# Patient Record
Sex: MALE | URBAN - METROPOLITAN AREA
[De-identification: names, ages, dates, MRNs, and addresses within clinical notes are randomized per-mention and may not be internally consistent; named-entity substitution may affect disease eponyms.]

---

## 2022-06-04 ENCOUNTER — TELEPHONE ENCOUNTER (OUTPATIENT)
Dept: URBAN - METROPOLITAN AREA CLINIC 68 | Facility: CLINIC | Age: 72
End: 2022-06-04

## 2022-06-05 ENCOUNTER — TELEPHONE ENCOUNTER (OUTPATIENT)
Dept: URBAN - METROPOLITAN AREA CLINIC 68 | Facility: CLINIC | Age: 72
End: 2022-06-05

## 2022-06-25 ENCOUNTER — TELEPHONE ENCOUNTER (OUTPATIENT)
Age: 72
End: 2022-06-25

## 2022-06-26 ENCOUNTER — TELEPHONE ENCOUNTER (OUTPATIENT)
Age: 72
End: 2022-06-26

## 2023-12-01 DIAGNOSIS — I10 ESSENTIAL (PRIMARY) HYPERTENSION: ICD-10-CM

## 2023-12-01 PROBLEM — D68.51 FACTOR V LEIDEN MUTATION (MULTI): Status: ACTIVE | Noted: 2023-12-01

## 2023-12-01 PROBLEM — E78.5 HYPERLIPIDEMIA: Status: ACTIVE | Noted: 2023-12-01

## 2023-12-01 PROBLEM — K21.9 ACID REFLUX: Status: ACTIVE | Noted: 2023-12-01

## 2023-12-01 PROBLEM — G62.9 NEUROPATHY: Status: ACTIVE | Noted: 2023-12-01

## 2023-12-01 PROBLEM — I48.0 PAROXYSMAL ATRIAL FIBRILLATION (MULTI): Status: ACTIVE | Noted: 2023-12-01

## 2023-12-01 PROBLEM — I49.3 PREMATURE VENTRICULAR CONTRACTIONS: Status: ACTIVE | Noted: 2023-12-01

## 2023-12-01 RX ORDER — WARFARIN 4 MG/1
TABLET ORAL
COMMUNITY

## 2023-12-01 RX ORDER — GLUCOSAMINE/CHONDR SU A SOD 167-133 MG
2500 CAPSULE ORAL DAILY
COMMUNITY

## 2023-12-01 RX ORDER — EZETIMIBE 10 MG/1
10 TABLET ORAL DAILY
COMMUNITY

## 2023-12-01 RX ORDER — METOPROLOL SUCCINATE 25 MG/1
12.5 TABLET, EXTENDED RELEASE ORAL DAILY
COMMUNITY
End: 2024-03-18 | Stop reason: SDUPTHER

## 2023-12-01 RX ORDER — AMLODIPINE BESYLATE 10 MG/1
10 TABLET ORAL DAILY
COMMUNITY
End: 2024-03-18

## 2023-12-06 RX ORDER — METOPROLOL SUCCINATE 25 MG/1
12.5 TABLET, EXTENDED RELEASE ORAL DAILY
Qty: 45 TABLET | Refills: 3 | Status: SHIPPED | OUTPATIENT
Start: 2023-12-06 | End: 2024-02-26 | Stop reason: WASHOUT

## 2024-01-24 ENCOUNTER — APPOINTMENT (OUTPATIENT)
Dept: CARDIOLOGY | Facility: CLINIC | Age: 74
End: 2024-01-24
Payer: MEDICARE

## 2024-02-26 ENCOUNTER — OFFICE VISIT (OUTPATIENT)
Dept: CARDIOLOGY | Facility: CLINIC | Age: 74
End: 2024-02-26
Payer: MEDICARE

## 2024-02-26 VITALS
SYSTOLIC BLOOD PRESSURE: 116 MMHG | HEART RATE: 78 BPM | DIASTOLIC BLOOD PRESSURE: 86 MMHG | HEIGHT: 74 IN | BODY MASS INDEX: 33.24 KG/M2 | WEIGHT: 259 LBS

## 2024-02-26 DIAGNOSIS — I48.0 PAROXYSMAL ATRIAL FIBRILLATION (MULTI): ICD-10-CM

## 2024-02-26 DIAGNOSIS — I10 ESSENTIAL HYPERTENSION: ICD-10-CM

## 2024-02-26 DIAGNOSIS — D68.51 FACTOR V LEIDEN MUTATION (MULTI): ICD-10-CM

## 2024-02-26 DIAGNOSIS — E78.2 MIXED HYPERLIPIDEMIA: ICD-10-CM

## 2024-02-26 DIAGNOSIS — Z78.9 NEVER SMOKED TOBACCO: ICD-10-CM

## 2024-02-26 PROCEDURE — 1159F MED LIST DOCD IN RCRD: CPT | Performed by: INTERNAL MEDICINE

## 2024-02-26 PROCEDURE — 3074F SYST BP LT 130 MM HG: CPT | Performed by: INTERNAL MEDICINE

## 2024-02-26 PROCEDURE — 99214 OFFICE O/P EST MOD 30 MIN: CPT | Performed by: INTERNAL MEDICINE

## 2024-02-26 PROCEDURE — 1036F TOBACCO NON-USER: CPT | Performed by: INTERNAL MEDICINE

## 2024-02-26 PROCEDURE — 3079F DIAST BP 80-89 MM HG: CPT | Performed by: INTERNAL MEDICINE

## 2024-02-26 NOTE — PROGRESS NOTES
"Subjective   Russel Melgar is a 73 y.o. male       Chief Complaint    Follow-up          HPI     Patient returns in follow-up of problems as noted.  He is done well.  Blood pressure control is satisfactory and recent lipid profile demonstrates very favorable control of his lipids through the use of ezetimibe and niacin.  In light of all the above we believe he is doing well.    The reason and/or rationale for blood pressure and lipid control was discussed in great detail and he was educated regarding symptoms to watch for.  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    Stroke risk is mitigated by chronic warfarin therapy not only for the arrhythmia but also for his factor V Leiden mutation.  Because of all the above Elda is doing well.  As before the merits of diet and weight loss were advocated.        Vitals:    02/26/24 0948   BP: 116/86   BP Location: Right arm   Patient Position: Sitting   Pulse: 78   Weight: 117 kg (259 lb)   Height: 1.88 m (6' 2\")        Objective   Physical Exam  Constitutional:       Appearance: Normal appearance. He is obese.   HENT:      Nose: Nose normal.   Neck:      Vascular: No carotid bruit.   Cardiovascular:      Rate and Rhythm: Normal rate.      Pulses: Normal pulses.      Heart sounds: Normal heart sounds.   Pulmonary:      Effort: Pulmonary effort is normal.   Abdominal:      General: Bowel sounds are normal.      Palpations: Abdomen is soft.   Musculoskeletal:         General: Normal range of motion.      Cervical back: Normal range of motion.      Right lower leg: No edema.      Left lower leg: No edema.   Skin:     General: Skin is warm and dry.   Neurological:      General: No focal deficit present.      Mental Status: He is alert.   Psychiatric:         Mood and Affect: Mood normal.         Behavior: Behavior normal.         Thought Content: Thought content normal.         Judgment: " Judgment normal.         Allergies  Bempedoic acid, Ezetimibe-simvastatin, Statins-hmg-coa reductase inhibitors, and Penicillins     Current Medications    Current Outpatient Medications:     amLODIPine (Norvasc) 10 mg tablet, Take 1 tablet (10 mg) by mouth once daily., Disp: , Rfl:     ezetimibe (Zetia) 10 mg tablet, Take 1 tablet (10 mg) by mouth once daily., Disp: , Rfl:     metoprolol succinate XL (Toprol-XL) 25 mg 24 hr tablet, Take 0.5 tablets (12.5 mg) by mouth once daily., Disp: , Rfl:     niacin 500 mg tablet, Take 5 tablets (2,500 mg) by mouth once daily., Disp: , Rfl:     warfarin (Coumadin) 4 mg tablet, Take by mouth. Take as directed by Dr. Love, Disp: , Rfl:                      Assessment/Plan   1. Paroxysmal atrial fibrillation (CMS/HCC)  No symptomatic recurrence based upon review.  Stroke risk mitigated by warfarin therapy    2. Essential hypertension  Good control on present therapy.  No need for adjustment    3. Mixed hyperlipidemia  Good control on present therapy.  No need for adjustment    4. Never smoked tobacco  Noted    5. Factor V Leiden mutation (CMS/HCC)  His coagulopathy necessitates anticoagulation.  This also suits as coverage for his history of paroxysmal atrial fibrillation.          Scribe Attestation  By signing my name below, I, Sadaf Odell LPN   attest that this documentation has been prepared under the direction and in the presence of Oh Garcia MD.     Provider Attestation - Scribe documentation    All medical record entries made by the Scribe were at my direction and personally dictated by me. I have reviewed the chart and agree that the record accurately reflects my personal performance of the history, physical exam, discussion and plan.

## 2024-02-26 NOTE — LETTER
"February 26, 2024     Abundio Love DO  [address] [place] [address]    Patient: Russel Melgar   YOB: 1950   Date of Visit: 2/26/2024       Dear Dr. Abundio Love DO:    Thank you for referring Russel Melgar to me for evaluation. Below are my notes for this consultation.  If you have questions, please do not hesitate to call me. I look forward to following your patient along with you.       Sincerely,     Oh Garcia MD      CC: No Recipients  ______________________________________________________________________________________    Subjective   Russel Melgar is a 73 y.o. male       Chief Complaint    Follow-up          HPI     Patient returns in follow-up of problems as noted.  He is done well.  Blood pressure control is satisfactory and recent lipid profile demonstrates very favorable control of his lipids through the use of ezetimibe and niacin.  In light of all the above we believe he is doing well.    The reason and/or rationale for blood pressure and lipid control was discussed in great detail and he was educated regarding symptoms to watch for.  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    Stroke risk is mitigated by chronic warfarin therapy not only for the arrhythmia but also for his factor V Leiden mutation.  Because of all the above Elda is doing well.  As before the merits of diet and weight loss were advocated.        Vitals:    02/26/24 0948   BP: 116/86   BP Location: Right arm   Patient Position: Sitting   Pulse: 78   Weight: 117 kg (259 lb)   Height: 1.88 m (6' 2\")        Objective   Physical Exam  Constitutional:       Appearance: Normal appearance. He is obese.   HENT:      Nose: Nose normal.   Neck:      Vascular: No carotid bruit.   Cardiovascular:      Rate and Rhythm: Normal rate.      Pulses: Normal pulses.      Heart sounds: Normal heart sounds.   Pulmonary: "      Effort: Pulmonary effort is normal.   Abdominal:      General: Bowel sounds are normal.      Palpations: Abdomen is soft.   Musculoskeletal:         General: Normal range of motion.      Cervical back: Normal range of motion.      Right lower leg: No edema.      Left lower leg: No edema.   Skin:     General: Skin is warm and dry.   Neurological:      General: No focal deficit present.      Mental Status: He is alert.   Psychiatric:         Mood and Affect: Mood normal.         Behavior: Behavior normal.         Thought Content: Thought content normal.         Judgment: Judgment normal.         Allergies  Bempedoic acid, Ezetimibe-simvastatin, Statins-hmg-coa reductase inhibitors, and Penicillins     Current Medications    Current Outpatient Medications:   •  amLODIPine (Norvasc) 10 mg tablet, Take 1 tablet (10 mg) by mouth once daily., Disp: , Rfl:   •  ezetimibe (Zetia) 10 mg tablet, Take 1 tablet (10 mg) by mouth once daily., Disp: , Rfl:   •  metoprolol succinate XL (Toprol-XL) 25 mg 24 hr tablet, Take 0.5 tablets (12.5 mg) by mouth once daily., Disp: , Rfl:   •  niacin 500 mg tablet, Take 5 tablets (2,500 mg) by mouth once daily., Disp: , Rfl:   •  warfarin (Coumadin) 4 mg tablet, Take by mouth. Take as directed by Dr. Love, Disp: , Rfl:                      Assessment/Plan   1. Paroxysmal atrial fibrillation (CMS/HCC)  No symptomatic recurrence based upon review.  Stroke risk mitigated by warfarin therapy    2. Essential hypertension  Good control on present therapy.  No need for adjustment    3. Mixed hyperlipidemia  Good control on present therapy.  No need for adjustment    4. Never smoked tobacco  Noted    5. Factor V Leiden mutation (CMS/HCC)  His coagulopathy necessitates anticoagulation.  This also suits as coverage for his history of paroxysmal atrial fibrillation.          Scribe Attestation  By signing my name below, Heena YOUNG LPN  , Scribjames   attest that this documentation has been  prepared under the direction and in the presence of Oh Garcia MD.     Provider Attestation - Scribe documentation    All medical record entries made by the Scribe were at my direction and personally dictated by me. I have reviewed the chart and agree that the record accurately reflects my personal performance of the history, physical exam, discussion and plan.

## 2024-02-26 NOTE — PATIENT INSTRUCTIONS
Please bring all medicines, vitamins, and herbal supplements with you when you come to the office.    Prescriptions will not be filled unless you are compliant with your follow up appointments or have a follow up appointment scheduled as per instruction of your physician. Refills should be requested at the time of your visit.     BMI was above normal measurement. Current weight: 117 kg (259 lb)  Weight change since last visit (-) denotes wt loss -2 lbs   Weight loss needed to achieve BMI 25: 64.7 Lbs  Weight loss needed to achieve BMI 30: 25.8 Lbs  Provided instructions on dietary changes  Provided instructions on exercise.

## 2024-03-15 DIAGNOSIS — I10 ESSENTIAL (PRIMARY) HYPERTENSION: ICD-10-CM

## 2024-03-18 DIAGNOSIS — I10 ESSENTIAL HYPERTENSION: ICD-10-CM

## 2024-03-18 RX ORDER — METOPROLOL SUCCINATE 25 MG/1
12.5 TABLET, EXTENDED RELEASE ORAL DAILY
Qty: 45 TABLET | Refills: 3 | Status: SHIPPED | OUTPATIENT
Start: 2024-03-18 | End: 2025-03-18

## 2024-03-18 RX ORDER — AMLODIPINE BESYLATE 10 MG/1
10 TABLET ORAL DAILY
Qty: 90 TABLET | Refills: 3 | Status: SHIPPED | OUTPATIENT
Start: 2024-03-18

## 2024-03-27 ENCOUNTER — TELEPHONE (OUTPATIENT)
Dept: CARDIOLOGY | Facility: CLINIC | Age: 74
End: 2024-03-27
Payer: MEDICARE

## 2024-03-27 NOTE — TELEPHONE ENCOUNTER
Phoned patient's wife back. She had questions about BNP results done at a out reach clinic. BNP was 571. Lab scanned into chart. Please review. Patient's wife is concerned.     To Dr. Oh Garcia MD

## 2024-06-13 DIAGNOSIS — E78.5 HYPERLIPIDEMIA, UNSPECIFIED: ICD-10-CM

## 2024-06-13 RX ORDER — EZETIMIBE 10 MG/1
10 TABLET ORAL DAILY
Qty: 90 TABLET | Refills: 3 | Status: SHIPPED | OUTPATIENT
Start: 2024-06-13 | End: 2025-06-13

## 2024-09-17 ENCOUNTER — APPOINTMENT (OUTPATIENT)
Dept: CARDIOLOGY | Facility: CLINIC | Age: 74
End: 2024-09-17
Payer: MEDICARE

## 2024-09-17 VITALS
DIASTOLIC BLOOD PRESSURE: 70 MMHG | SYSTOLIC BLOOD PRESSURE: 110 MMHG | HEIGHT: 74 IN | WEIGHT: 250 LBS | HEART RATE: 72 BPM | BODY MASS INDEX: 32.08 KG/M2

## 2024-09-17 DIAGNOSIS — I48.21 PERMANENT ATRIAL FIBRILLATION (MULTI): ICD-10-CM

## 2024-09-17 DIAGNOSIS — Z78.9 NEVER SMOKED TOBACCO: ICD-10-CM

## 2024-09-17 DIAGNOSIS — D68.51 FACTOR V LEIDEN MUTATION (MULTI): ICD-10-CM

## 2024-09-17 DIAGNOSIS — I10 ESSENTIAL HYPERTENSION: ICD-10-CM

## 2024-09-17 DIAGNOSIS — Z79.01 LONG TERM CURRENT USE OF ANTICOAGULANT THERAPY: ICD-10-CM

## 2024-09-17 DIAGNOSIS — E78.5 HYPERLIPIDEMIA, UNSPECIFIED: ICD-10-CM

## 2024-09-17 DIAGNOSIS — E78.2 MIXED HYPERLIPIDEMIA: ICD-10-CM

## 2024-09-17 DIAGNOSIS — Z78.9 STATIN INTOLERANCE: ICD-10-CM

## 2024-09-17 PROCEDURE — 3078F DIAST BP <80 MM HG: CPT | Performed by: INTERNAL MEDICINE

## 2024-09-17 PROCEDURE — 1036F TOBACCO NON-USER: CPT | Performed by: INTERNAL MEDICINE

## 2024-09-17 PROCEDURE — 3074F SYST BP LT 130 MM HG: CPT | Performed by: INTERNAL MEDICINE

## 2024-09-17 PROCEDURE — 1159F MED LIST DOCD IN RCRD: CPT | Performed by: INTERNAL MEDICINE

## 2024-09-17 PROCEDURE — 99214 OFFICE O/P EST MOD 30 MIN: CPT | Performed by: INTERNAL MEDICINE

## 2024-09-17 RX ORDER — EZETIMIBE 10 MG/1
10 TABLET ORAL EVERY OTHER DAY
Qty: 45 TABLET | Refills: 3 | Status: SHIPPED | OUTPATIENT
Start: 2024-09-17 | End: 2025-09-17

## 2024-09-17 RX ORDER — EZETIMIBE 10 MG/1
10 TABLET ORAL EVERY OTHER DAY
Qty: 45 TABLET | Refills: 3 | Status: CANCELLED | OUTPATIENT
Start: 2024-09-17 | End: 2025-09-17

## 2024-09-17 NOTE — PROGRESS NOTES
"Subjective   Russel Melgar is a 74 y.o. male       Chief Complaint    Follow-up          HPI   Patient is a 74-year-old  who has previously followed with Dr. Garcia for permanent atrial fibrillation.  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  He maintains active lifestyle.  He has been on Coumadin therapy managed by [facility].  He has hypertension controlled on amlodipine and metoprolol and has factor V Leiden with no previous VTE.  Recent lab data were provided, it was reviewed and shared with the patient with no concern noted.  Review of system was normal physical examination was only remarkable for irregular rhythm and class I obesity.    Assessment/recommendations:    1-permanent atrial fibrillation managed with metoprolol and Coumadin, asymptomatic with no underlying organic heart disease.  Echocardiogram 2019 was unremarkable.  No previous strokes or TIA.  Past medical therapy will be left unchanged  2-essential hypertension, currently under control on amlodipine and metoprolol, well-tolerated  3-factor V Leiden with no previous VTE  4-high risk medication with warfarin with no bleeding complications  5-class I obesity, patient maintains active lifestyle and was encouraged to keep doing so.  6-dyslipidemia on ezetimibe and niacin, he is intolerant to statin, his lipids are under control  Review of Systems   All other systems reviewed and are negative.           Vitals:    09/17/24 0855   BP: 110/70   BP Location: Right arm   Patient Position: Sitting   Pulse: 72   Weight: 113 kg (250 lb)   Height: 1.88 m (6' 2\")        Objective   Physical Exam  Constitutional:       Appearance: Normal appearance.   HENT:      Nose: Nose normal.   Neck:      Vascular: No carotid bruit.   Cardiovascular:      Rate and Rhythm: Normal rate. Rhythm irregular.      Pulses: Normal pulses.      Heart sounds: Normal heart sounds.   Pulmonary:      Effort: Pulmonary effort is " normal.   Abdominal:      General: Bowel sounds are normal.      Palpations: Abdomen is soft.   Musculoskeletal:         General: Normal range of motion.      Cervical back: Normal range of motion.      Right lower leg: No edema.      Left lower leg: No edema.   Skin:     General: Skin is warm and dry.   Neurological:      General: No focal deficit present.      Mental Status: He is alert.   Psychiatric:         Mood and Affect: Mood normal.         Behavior: Behavior normal.         Thought Content: Thought content normal.         Judgment: Judgment normal.         Allergies  Bempedoic acid, Ezetimibe-simvastatin, Penicillins, and Statins-hmg-coa reductase inhibitors     Current Medications    Current Outpatient Medications:     amLODIPine (Norvasc) 10 mg tablet, TAKE 1 TABLET BY MOUTH EVERY DAY, Disp: 90 tablet, Rfl: 3    ezetimibe (Zetia) 10 mg tablet, Take 1 tablet (10 mg) by mouth once daily., Disp: 90 tablet, Rfl: 3    metoprolol succinate XL (Toprol-XL) 25 mg 24 hr tablet, Take 0.5 tablets (12.5 mg) by mouth once daily., Disp: 45 tablet, Rfl: 3    niacin 500 mg tablet, Take 5 tablets (2,500 mg) by mouth once daily., Disp: , Rfl:     warfarin (Coumadin) 4 mg tablet, Take by mouth. Take as directed by Dr. Love, Disp: , Rfl:                      Assessment/Plan   1. Paroxysmal atrial fibrillation (Multi)  Follow Up In Cardiology      2. Essential hypertension  Follow Up In Cardiology      3. Mixed hyperlipidemia        4. Factor V Leiden mutation (Multi)        5. Long term current use of anticoagulant therapy        6. Never smoked tobacco        7. BMI 32.0-32.9,adult        8. Statin intolerance        9. Hyperlipidemia, unspecified                 Scribe Attestation  By signing my name below, Nan YOUNG LPN  , Scribjames   attest that this documentation has been prepared under the direction and in the presence of Kamilah Stockton MD.     Provider Attestation - Scribe documentation    All medical record  entries made by the Scribe were at my direction and personally dictated by me. I have reviewed the chart and agree that the record accurately reflects my personal performance of the history, physical exam, discussion and plan.

## 2024-09-17 NOTE — LETTER
September 17, 2024     Abundio Love DO  [address]  [place] [address]    Patient: Russel Melgar   YOB: 1950   Date of Visit: 9/17/2024       Dear Dr. Abundio Love DO:    Thank you for referring Russel Melgar to me for evaluation. Below are my notes for this consultation.  If you have questions, please do not hesitate to call me. I look forward to following your patient along with you.       Sincerely,     Kamilah Stockton MD      CC: No Recipients  ______________________________________________________________________________________    Subjective   Russel Melgar is a 74 y.o. male       Chief Complaint    Follow-up          HPI   Patient is a 74-year-old  who has previously followed with Dr. Garcia for permanent atrial fibrillation.  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  He maintains active lifestyle.  He has been on Coumadin therapy managed by [facility].  He has hypertension controlled on amlodipine and metoprolol and has factor V Leiden with no previous VTE.  Recent lab data were provided, it was reviewed and shared with the patient with no concern noted.  Review of system was normal physical examination was only remarkable for irregular rhythm and class I obesity.    Assessment/recommendations:    1-permanent atrial fibrillation managed with metoprolol and Coumadin, asymptomatic with no underlying organic heart disease.  Echocardiogram 2019 was unremarkable.  No previous strokes or TIA.  Past medical therapy will be left unchanged  2-essential hypertension, currently under control on amlodipine and metoprolol, well-tolerated  3-factor V Leiden with no previous VTE  4-high risk medication with warfarin with no bleeding complications  5-class I obesity, patient maintains active lifestyle and was encouraged to keep doing so.  6-dyslipidemia on ezetimibe and niacin, he is intolerant to statin, his lipids are under  "control  Review of Systems   All other systems reviewed and are negative.           Vitals:    09/17/24 0855   BP: 110/70   BP Location: Right arm   Patient Position: Sitting   Pulse: 72   Weight: 113 kg (250 lb)   Height: 1.88 m (6' 2\")        Objective   Physical Exam  Constitutional:       Appearance: Normal appearance.   HENT:      Nose: Nose normal.   Neck:      Vascular: No carotid bruit.   Cardiovascular:      Rate and Rhythm: Normal rate. Rhythm irregular.      Pulses: Normal pulses.      Heart sounds: Normal heart sounds.   Pulmonary:      Effort: Pulmonary effort is normal.   Abdominal:      General: Bowel sounds are normal.      Palpations: Abdomen is soft.   Musculoskeletal:         General: Normal range of motion.      Cervical back: Normal range of motion.      Right lower leg: No edema.      Left lower leg: No edema.   Skin:     General: Skin is warm and dry.   Neurological:      General: No focal deficit present.      Mental Status: He is alert.   Psychiatric:         Mood and Affect: Mood normal.         Behavior: Behavior normal.         Thought Content: Thought content normal.         Judgment: Judgment normal.         Allergies  Bempedoic acid, Ezetimibe-simvastatin, Penicillins, and Statins-hmg-coa reductase inhibitors     Current Medications    Current Outpatient Medications:   •  amLODIPine (Norvasc) 10 mg tablet, TAKE 1 TABLET BY MOUTH EVERY DAY, Disp: 90 tablet, Rfl: 3  •  ezetimibe (Zetia) 10 mg tablet, Take 1 tablet (10 mg) by mouth once daily., Disp: 90 tablet, Rfl: 3  •  metoprolol succinate XL (Toprol-XL) 25 mg 24 hr tablet, Take 0.5 tablets (12.5 mg) by mouth once daily., Disp: 45 tablet, Rfl: 3  •  niacin 500 mg tablet, Take 5 tablets (2,500 mg) by mouth once daily., Disp: , Rfl:   •  warfarin (Coumadin) 4 mg tablet, Take by mouth. Take as directed by Dr. Love, Disp: , Rfl:                      Assessment/Plan   1. Paroxysmal atrial fibrillation (Multi)  Follow Up In Cardiology    "   2. Essential hypertension  Follow Up In Cardiology      3. Mixed hyperlipidemia        4. Factor V Leiden mutation (Multi)        5. Long term current use of anticoagulant therapy        6. Never smoked tobacco        7. BMI 32.0-32.9,adult        8. Statin intolerance        9. Hyperlipidemia, unspecified                 Scribe Attestation  By signing my name below, INan LPN, Scribe   attest that this documentation has been prepared under the direction and in the presence of Kamilah Stockton MD.     Provider Attestation - Scribe documentation    All medical record entries made by the Scribe were at my direction and personally dictated by me. I have reviewed the chart and agree that the record accurately reflects my personal performance of the history, physical exam, discussion and plan.

## 2024-09-17 NOTE — PATIENT INSTRUCTIONS
Please bring all medicines, vitamins, and herbal supplements with you when you come to the office.    Prescriptions will not be filled unless you are compliant with your follow up appointments or have a follow up appointment scheduled as per instruction of your physician. Refills should be requested at the time of your visit.     BMI was above normal measurement. Current weight: 113 kg (250 lb)  Weight change since last visit (-) denotes wt loss -9 lbs   Weight loss needed to achieve BMI 25: 55.7 Lbs  Weight loss needed to achieve BMI 30: 16.8 Lbs  Provided instructions on dietary changes  Provided instructions on exercise.

## 2025-03-08 DIAGNOSIS — I10 ESSENTIAL (PRIMARY) HYPERTENSION: ICD-10-CM

## 2025-03-08 DIAGNOSIS — I10 ESSENTIAL HYPERTENSION: ICD-10-CM

## 2025-03-10 RX ORDER — METOPROLOL SUCCINATE 25 MG/1
12.5 TABLET, EXTENDED RELEASE ORAL DAILY
Qty: 45 TABLET | Refills: 3 | Status: SHIPPED | OUTPATIENT
Start: 2025-03-10

## 2025-03-10 RX ORDER — AMLODIPINE BESYLATE 10 MG/1
10 TABLET ORAL DAILY
Qty: 90 TABLET | Refills: 3 | Status: SHIPPED | OUTPATIENT
Start: 2025-03-10

## 2025-06-18 ENCOUNTER — APPOINTMENT (OUTPATIENT)
Dept: CARDIOLOGY | Facility: CLINIC | Age: 75
End: 2025-06-18
Payer: MEDICARE

## 2025-06-18 VITALS
BODY MASS INDEX: 31.7 KG/M2 | DIASTOLIC BLOOD PRESSURE: 80 MMHG | SYSTOLIC BLOOD PRESSURE: 120 MMHG | HEART RATE: 64 BPM | WEIGHT: 247 LBS | HEIGHT: 74 IN

## 2025-06-18 DIAGNOSIS — E66.811 OBESITY, CLASS I, BMI 30-34.9: ICD-10-CM

## 2025-06-18 DIAGNOSIS — I48.21 PERMANENT ATRIAL FIBRILLATION (MULTI): Primary | ICD-10-CM

## 2025-06-18 DIAGNOSIS — Z79.01 LONG TERM CURRENT USE OF ANTICOAGULANT THERAPY: ICD-10-CM

## 2025-06-18 DIAGNOSIS — D68.51 FACTOR V LEIDEN MUTATION (MULTI): ICD-10-CM

## 2025-06-18 DIAGNOSIS — Z78.9 STATIN INTOLERANCE: ICD-10-CM

## 2025-06-18 DIAGNOSIS — E78.2 MIXED HYPERLIPIDEMIA: ICD-10-CM

## 2025-06-18 DIAGNOSIS — I10 ESSENTIAL HYPERTENSION: ICD-10-CM

## 2025-06-18 DIAGNOSIS — Z78.9 NEVER SMOKED TOBACCO: ICD-10-CM

## 2025-06-18 PROCEDURE — 3079F DIAST BP 80-89 MM HG: CPT | Performed by: INTERNAL MEDICINE

## 2025-06-18 PROCEDURE — 99214 OFFICE O/P EST MOD 30 MIN: CPT | Performed by: INTERNAL MEDICINE

## 2025-06-18 PROCEDURE — 3074F SYST BP LT 130 MM HG: CPT | Performed by: INTERNAL MEDICINE

## 2025-06-18 PROCEDURE — 1036F TOBACCO NON-USER: CPT | Performed by: INTERNAL MEDICINE

## 2025-06-18 PROCEDURE — 1159F MED LIST DOCD IN RCRD: CPT | Performed by: INTERNAL MEDICINE

## 2025-06-18 PROCEDURE — G2211 COMPLEX E/M VISIT ADD ON: HCPCS | Performed by: INTERNAL MEDICINE

## 2025-06-18 NOTE — LETTER
June 18, 2025     Abundio Love DO  [address]  [place] [address]    Patient: Russel Melgar   YOB: 1950   Date of Visit: 6/18/2025       Dear Dr. Abundio Love DO:    Thank you for referring Russel Melgar to me for evaluation. Below are my notes for this consultation.  If you have questions, please do not hesitate to call me. I look forward to following your patient along with you.       Sincerely,     Kamilah Stockton MD      CC: No Recipients  ______________________________________________________________________________________    HPI:  Patient is in the office for follow-up since he was last seen back in September 2020 for for permanent atrial fibrillation along with hypertension and dyslipidemia.  Since he was last seen in the office he has done very well and has had no complaints of palpitations or chest pain no dyspnea on exertion he maintains very active lifestyle.  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  His blood pressure is under control.  He has blood work that was done recently including CBC basic metabolic profile and other labs which I reviewed done through his PCP and his lab results were excellent.  His LDL cholesterol in the 120 mg/dL range and for primary prevention purposes this is acceptable.  Apart from class I obesity physical examination was unremarkable.  His weight is only 3 pounds below his weight last time.    Assessment/recommendations:     1-permanent atrial fibrillation managed with metoprolol and Coumadin, asymptomatic with no underlying organic heart disease.  Echocardiogram 2019 was unremarkable.  No previous strokes or TIA.  Present medical therapy will left unchanged.  2-essential hypertension, currently under control on amlodipine and metoprolol, well-tolerated, patient follows low-salt diet, is trying to lose weight and maintains active lifestyle.  He does not have sleep  "apnea  3-factor V Leiden with no previous VTE, currently anticoagulated with Coumadin  4-high risk medication with warfarin with no bleeding complications, CBC has been followed and has been normal.  The risk of bleeding was discussed  5-class I obesity, patient maintains active lifestyle and was encouraged to keep doing so.  6-dyslipidemia on ezetimibe and niacin, he is intolerant to statin, his lipids are under control for primary prevention purposes, LDL cholesterol below 130 mg/dL based on testing June 2025.    Review of Systems   All other systems reviewed and are negative.           Vitals:    06/18/25 0844   BP: 120/80   BP Location: Right arm   Patient Position: Sitting   Pulse: 64   Weight: 112 kg (247 lb)   Height: 1.88 m (6' 2\")        Objective   Physical Exam  Constitutional:       Appearance: Normal appearance.   HENT:      Nose: Nose normal.   Neck:      Vascular: No carotid bruit.   Cardiovascular:      Rate and Rhythm: Normal rate.      Pulses: Normal pulses.      Heart sounds: Normal heart sounds.   Pulmonary:      Effort: Pulmonary effort is normal.   Abdominal:      General: Bowel sounds are normal.      Palpations: Abdomen is soft.   Musculoskeletal:         General: Normal range of motion.      Cervical back: Normal range of motion.      Right lower leg: No edema.      Left lower leg: No edema.   Skin:     General: Skin is warm and dry.   Neurological:      General: No focal deficit present.      Mental Status: He is alert.   Psychiatric:         Mood and Affect: Mood normal.         Behavior: Behavior normal.         Thought Content: Thought content normal.         Judgment: Judgment normal.         Allergies  Bempedoic acid, Ezetimibe-simvastatin, Penicillins, and Statins-hmg-coa reductase inhibitors     Current Medications  Current Outpatient Medications   Medication Instructions   • amLODIPine (NORVASC) 10 mg, oral, Daily   • ezetimibe (ZETIA) 10 mg, oral, Every other day   • metoprolol " succinate XL (TOPROL-XL) 12.5 mg, oral, Daily   • niacin 2,500 mg, Daily   • warfarin (Coumadin) 4 mg tablet Take by mouth. Take as directed by Dr. Love                        Assessment/Plan   1. Paroxysmal atrial fibrillation (Multi)  Follow Up In Cardiology      2. Essential hypertension  Follow Up In Cardiology      3. Premature ventricular contractions        4. Long term current use of anticoagulant therapy        5. Factor V Leiden mutation (Multi)        6. Statin intolerance        7. Mixed hyperlipidemia        8. BMI 31.0-31.9,adult        9. Never smoked tobacco                 Scribe Attestation  By signing my name below, I, Niya CRUZ LPN, Scribe   attest that this documentation has been prepared under the direction and in the presence of Kamilah Stockton MD.     Provider Attestation - Scribe documentation    All medical record entries made by the Scribe were at my direction and personally dictated by me. I have reviewed the chart and agree that the record accurately reflects my personal performance of the history, physical exam, discussion and plan.

## 2025-06-18 NOTE — PATIENT INSTRUCTIONS
Please bring all medicines, vitamins, and herbal supplements with you when you come to the office.    Prescriptions will not be filled unless you are compliant with your follow up appointments or have a follow up appointment scheduled as per instruction of your physician. Refills should be requested at the time of your visit.     BMI was above normal measurement. Current weight: 112 kg (247 lb)  Weight change since last visit (-) denotes wt loss -3 lbs   Weight loss needed to achieve BMI 25: 52.7 Lbs  Weight loss needed to achieve BMI 30: 13.8 Lbs  Provided instructions on dietary changes  Provided instructions on exercise.

## 2025-06-18 NOTE — PROGRESS NOTES
HPI:  Patient is in the office for follow-up since he was last seen back in September 2020 for for permanent atrial fibrillation along with hypertension and dyslipidemia.  Since he was last seen in the office he has done very well and has had no complaints of palpitations or chest pain no dyspnea on exertion he maintains very active lifestyle.  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  He is presently retired.  His blood pressure is under control.  He has blood work that was done recently including CBC basic metabolic profile and other labs which I reviewed done through his PCP and his lab results were excellent.  His LDL cholesterol in the 120 mg/dL range and for primary prevention purposes this is acceptable.  Apart from class I obesity physical examination was unremarkable.  His weight is only 3 pounds below his weight last time.    Assessment/recommendations:     1-permanent atrial fibrillation managed with metoprolol and Coumadin, asymptomatic with no underlying organic heart disease.  Echocardiogram 2019 was unremarkable.  No previous strokes or TIA.  Present medical therapy will left unchanged.  2-essential hypertension, currently under control on amlodipine and metoprolol, well-tolerated, patient follows low-salt diet, is trying to lose weight and maintains active lifestyle.  He does not have sleep apnea  3-factor V Leiden with no previous VTE, currently anticoagulated with Coumadin  4-high risk medication with warfarin with no bleeding complications, CBC has been followed and has been normal.  The risk of bleeding was discussed  5-class I obesity, patient maintains active lifestyle and was encouraged to keep doing so.  6-dyslipidemia on ezetimibe and niacin, he is intolerant to statin, his lipids are under control for primary prevention purposes, LDL cholesterol below 130 mg/dL based on testing June 2025.    Review of Systems   All other systems reviewed and are  "negative.           Vitals:    06/18/25 0844   BP: 120/80   BP Location: Right arm   Patient Position: Sitting   Pulse: 64   Weight: 112 kg (247 lb)   Height: 1.88 m (6' 2\")        Objective   Physical Exam  Constitutional:       Appearance: Normal appearance.   HENT:      Nose: Nose normal.   Neck:      Vascular: No carotid bruit.   Cardiovascular:      Rate and Rhythm: Normal rate.      Pulses: Normal pulses.      Heart sounds: Normal heart sounds.   Pulmonary:      Effort: Pulmonary effort is normal.   Abdominal:      General: Bowel sounds are normal.      Palpations: Abdomen is soft.   Musculoskeletal:         General: Normal range of motion.      Cervical back: Normal range of motion.      Right lower leg: No edema.      Left lower leg: No edema.   Skin:     General: Skin is warm and dry.   Neurological:      General: No focal deficit present.      Mental Status: He is alert.   Psychiatric:         Mood and Affect: Mood normal.         Behavior: Behavior normal.         Thought Content: Thought content normal.         Judgment: Judgment normal.         Allergies  Bempedoic acid, Ezetimibe-simvastatin, Penicillins, and Statins-hmg-coa reductase inhibitors     Current Medications  Current Outpatient Medications   Medication Instructions    amLODIPine (NORVASC) 10 mg, oral, Daily    ezetimibe (ZETIA) 10 mg, oral, Every other day    metoprolol succinate XL (TOPROL-XL) 12.5 mg, oral, Daily    niacin 2,500 mg, Daily    warfarin (Coumadin) 4 mg tablet Take by mouth. Take as directed by Dr. Love                        Assessment/Plan   1. Paroxysmal atrial fibrillation (Multi)  Follow Up In Cardiology      2. Essential hypertension  Follow Up In Cardiology      3. Premature ventricular contractions        4. Long term current use of anticoagulant therapy        5. Factor V Leiden mutation (Multi)        6. Statin intolerance        7. Mixed hyperlipidemia        8. BMI 31.0-31.9,adult        9. Never smoked tobacco   "               Scribe Attestation  By signing my name below, INiya LPN, Scribe   attest that this documentation has been prepared under the direction and in the presence of Kamialh Stockton MD.     Provider Attestation - Scribe documentation    All medical record entries made by the Scribe were at my direction and personally dictated by me. I have reviewed the chart and agree that the record accurately reflects my personal performance of the history, physical exam, discussion and plan.

## 2026-03-24 ENCOUNTER — APPOINTMENT (OUTPATIENT)
Dept: CARDIOLOGY | Facility: CLINIC | Age: 76
End: 2026-03-24
Payer: MEDICARE